# Patient Record
Sex: FEMALE | Race: WHITE | NOT HISPANIC OR LATINO | URBAN - METROPOLITAN AREA
[De-identification: names, ages, dates, MRNs, and addresses within clinical notes are randomized per-mention and may not be internally consistent; named-entity substitution may affect disease eponyms.]

---

## 2024-10-08 PROBLEM — N30.00 ACUTE CYSTITIS WITHOUT HEMATURIA: Status: ACTIVE | Noted: 2024-10-08

## 2024-10-11 PROBLEM — N30.00 ACUTE CYSTITIS WITHOUT HEMATURIA: Status: RESOLVED | Noted: 2024-10-08 | Resolved: 2024-10-11

## 2024-10-25 PROBLEM — M35.00 SJOGREN'S SYNDROME: Chronic | Status: ACTIVE | Noted: 2024-10-25

## 2024-10-25 PROBLEM — I69.952: Chronic | Status: ACTIVE | Noted: 2022-10-12

## 2024-10-25 PROBLEM — G93.89 BRAIN MASS: Status: ACTIVE | Noted: 2022-01-25

## 2024-10-25 PROBLEM — Z86.711 HISTORY OF PULMONARY EMBOLISM: Status: ACTIVE | Noted: 2024-10-25

## 2024-10-25 PROBLEM — M17.0 PRIMARY OSTEOARTHRITIS OF BOTH KNEES: Status: ACTIVE | Noted: 2021-07-02

## 2024-10-25 PROBLEM — I65.21 STENOSIS OF RIGHT CAROTID ARTERY: Status: ACTIVE | Noted: 2024-10-25

## 2024-11-07 ENCOUNTER — TELEPHONE (OUTPATIENT)
Dept: CARDIOLOGY | Facility: CLINIC | Age: 69
End: 2024-11-07

## 2024-11-07 NOTE — TELEPHONE ENCOUNTER
Call from [facility]:  Need to know how to send back the Tele Unit--Lost the box and address, Please call:   [phone]-- For Nurse on [facility]

## 2024-11-12 ENCOUNTER — APPOINTMENT (OUTPATIENT)
Dept: CARDIOLOGY | Facility: CLINIC | Age: 69
End: 2024-11-12
Payer: COMMERCIAL